# Patient Record
Sex: MALE | Race: WHITE | NOT HISPANIC OR LATINO | Employment: OTHER | URBAN - NONMETROPOLITAN AREA
[De-identification: names, ages, dates, MRNs, and addresses within clinical notes are randomized per-mention and may not be internally consistent; named-entity substitution may affect disease eponyms.]

---

## 2021-05-24 ENCOUNTER — OFFICE VISIT (OUTPATIENT)
Dept: OBGYN CLINIC | Facility: CLINIC | Age: 56
End: 2021-05-24
Payer: COMMERCIAL

## 2021-05-24 VITALS
BODY MASS INDEX: 30.61 KG/M2 | HEIGHT: 72 IN | HEART RATE: 81 BPM | SYSTOLIC BLOOD PRESSURE: 150 MMHG | WEIGHT: 226 LBS | DIASTOLIC BLOOD PRESSURE: 90 MMHG | TEMPERATURE: 97.5 F

## 2021-05-24 DIAGNOSIS — S82.832A OTHER CLOSED FRACTURE OF DISTAL END OF LEFT FIBULA, INITIAL ENCOUNTER: ICD-10-CM

## 2021-05-24 DIAGNOSIS — M25.572 CHRONIC PAIN OF LEFT ANKLE: Primary | ICD-10-CM

## 2021-05-24 DIAGNOSIS — G89.29 CHRONIC PAIN OF LEFT ANKLE: Primary | ICD-10-CM

## 2021-05-24 PROCEDURE — 27786 TREATMENT OF ANKLE FRACTURE: CPT | Performed by: ORTHOPAEDIC SURGERY

## 2021-05-24 PROCEDURE — 99243 OFF/OP CNSLTJ NEW/EST LOW 30: CPT | Performed by: ORTHOPAEDIC SURGERY

## 2021-05-24 RX ORDER — NITROGLYCERIN 0.4 MG/1
0.4 TABLET SUBLINGUAL
COMMUNITY

## 2021-05-24 RX ORDER — CLOPIDOGREL BISULFATE 75 MG/1
75 TABLET ORAL DAILY
COMMUNITY
Start: 2021-05-09

## 2021-05-24 RX ORDER — AZITHROMYCIN 250 MG/1
1 TABLET, FILM COATED ORAL DAILY
COMMUNITY
Start: 2021-04-12

## 2021-05-24 RX ORDER — ALBUTEROL SULFATE 90 UG/1
2 AEROSOL, METERED RESPIRATORY (INHALATION) CONTINUOUS PRN
COMMUNITY
Start: 2021-04-12

## 2021-05-24 RX ORDER — MINOCYCLINE HYDROCHLORIDE 100 MG/1
100 CAPSULE ORAL 2 TIMES DAILY
COMMUNITY
Start: 2021-05-19

## 2021-05-24 RX ORDER — ROSUVASTATIN CALCIUM 20 MG/1
20 TABLET, COATED ORAL DAILY
COMMUNITY
Start: 2021-05-09

## 2021-05-24 RX ORDER — METOPROLOL SUCCINATE 50 MG/1
50 TABLET, EXTENDED RELEASE ORAL DAILY
COMMUNITY
Start: 2020-12-02

## 2021-05-24 RX ORDER — GABAPENTIN 100 MG/1
1 CAPSULE ORAL 3 TIMES DAILY
COMMUNITY
Start: 2021-05-19

## 2021-05-24 NOTE — PROGRESS NOTES
ASSESSMENT/PLAN:    Diagnoses and all orders for this visit:    Chronic pain of left ankle    Other closed fracture of distal end of left fibula, initial encounter          Plan: I reviewed the x-rays with him  A Cam boot was recommended and provided today  This is to be worn whenever he is up and around but may be removed for sleeping at night and for showering or bathing  Precautions have been reviewed, instructions provided and questions answered  I will see him in 3 weeks for re-evaluation with x-rays obtained at that time  He was encouraged to contact me if any questions or concerns arise  Return in about 3 weeks (around 6/14/2021)  _____________________________________________________  CHIEF COMPLAINT:  Chief Complaint   Patient presents with    Left Ankle - Pain, Numbness, Swelling         SUBJECTIVE:  Leo Santiago is a 61y o  year old male who presents for evaluation of his left ankle injured about 3 weeks ago when he suffered an inversion injury while mowing his lawn  He initially thought he had a sprain but due to persistent pain, swelling and difficulty tolerating ambulation he presented to his primary care physician's office on 05/20/2021  X-rays were obtained and he was referred for orthopedic consultation and treatment  He was not provided with any kind of brace or cast boot  He has been using an elastic sleeve  He complains of decreasing swelling but increasing pain  He is a self-employed  and has not been able to work due to the pain in his ankle  He states that he did not seek immediate medical attention as he thought he had sprained the ankle and did not need medical attention  He denies paresthesias  He complains of pain laterally        PAST MEDICAL HISTORY:  Past Medical History:   Diagnosis Date    ASCVD (arteriosclerotic cardiovascular disease)     Bronchitis, complicated     CAD (coronary artery disease) 2013    2 stents    Rosacea        PAST SURGICAL HISTORY:  Past Surgical History:   Procedure Laterality Date    ORIF ANKLE FRACTURE Right 2015       FAMILY HISTORY:  History reviewed  No pertinent family history  SOCIAL HISTORY:  Social History     Tobacco Use    Smoking status: Current Every Day Smoker     Packs/day: 0 25     Types: Cigarettes    Smokeless tobacco: Never Used    Tobacco comment: 36 pk yr hx   Substance Use Topics    Alcohol use: Not Currently     Frequency: Never    Drug use: Yes     Types: Marijuana     Comment: medical card, vape       MEDICATIONS:    Current Outpatient Medications:     albuterol (Ventolin HFA) 90 mcg/act inhaler, Inhale 2 puffs continuous as needed, Disp: , Rfl:     azithromycin (ZITHROMAX) 250 mg tablet, Take 1 tablet by mouth daily, Disp: , Rfl:     clopidogrel (PLAVIX) 75 mg tablet, Take 75 mg by mouth daily, Disp: , Rfl:     gabapentin (NEURONTIN) 100 mg capsule, Take 1 capsule by mouth 3 (three) times a day, Disp: , Rfl:     metoprolol succinate (TOPROL-XL) 50 mg 24 hr tablet, Take 50 mg by mouth daily, Disp: , Rfl:     minocycline (MINOCIN) 100 mg capsule, Take 100 mg by mouth 2 (two) times a day, Disp: , Rfl:     rosuvastatin (CRESTOR) 20 MG tablet, Take 20 mg by mouth daily, Disp: , Rfl:     nitroglycerin (NITROSTAT) 0 4 mg SL tablet, Place 0 4 mg under the tongue every 5 (five) minutes as needed for chest pain, Disp: , Rfl:     ALLERGIES:  No Known Allergies    Review of systems:   Constitutional: Negative for fatigue, fever or loss of apetite  HENT: Negative  Respiratory: Negative for shortness of breath, dyspnea  Cardiovascular: Negative for chest pain/tightness  Gastrointestinal: Negative for abdominal pain, N/V  Endocrine: Negative for cold/heat intolerance, unexplained weight loss/gain  Genitourinary: Negative for flank pain, dysuria, hematuria  Musculoskeletal:  Positive as in the HPI   Skin: Negative for rash      Neurological:  Negative  Psychiatric/Behavioral: Negative for agitation  _____________________________________________________  PHYSICAL EXAMINATION:    Blood pressure 150/90, pulse 81, temperature 97 5 °F (36 4 °C), height 6' (1 829 m), weight 103 kg (226 lb)  General: well developed and well nourished, alert, oriented times 3 and appears comfortable  Psychiatric: Normal  HEENT: Benign  Cardiovascular: Regular    Pulmonary: No wheezing or stridor  Abdomen: Soft, Nontender  Skin: No masses, erythema, lacerations, fluctation, ulcerations  Neurovascular: Motor and sensory exams are grossly intact except as limited by his injury  Pulses are palpable  MUSCULOSKELETAL EXAMINATION:    The left lower extremity exam demonstrates swelling of the lower part of the lower leg and the ankle/foot  He complains of diffuse tenderness about the ankle and foot but had significant tenderness over the distal fibula  He has somewhat limited active range of motion secondary to swelling and complains of pain primarily with plantar flexion  He has no instability with inversion or eversion stress although he does complain of pain  The skin is intact, warm and dry he has good color and capillary refill  The remainder of the lower extremity examination bilaterally is benign  _____________________________________________________  STUDIES REVIEWED:  X-rays of his ankle demonstrated a nondisplaced distal fibular fracture distal to the joint surface which is actually somewhat difficult to visualize on x-rays  The report was reviewed in Care everywhere      PROCEDURES:  Fracture / Dislocation Treatment    Date/Time: 5/24/2021 8:44 AM  Performed by: Tori Vera  Authorized by: Tori Vera     Patient Location:  Clinic  Verbal consent obtained?: Yes    Written consent obtained?: No    Risks and benefits: Risks, benefits and alternatives were discussed    Consent given by:  Patient  Patient states understanding of procedure being performed: Yes    Test results available and properly labeled: Yes    Radiology Images displayed and confirmed   If images not available, report reviewed: Yes    Injury location:  Ankle  Location details:  Left ankle  Injury type:  Fracture  Fracture type: lateral malleolus    Fracture type: lateral malleolus    Neurovascular status: Neurovascularly intact    Local anesthesia used?: No    Manipulation performed?: No    Immobilization:  Brace (Cast boot)  Patient tolerance:  Patient tolerated the procedure well with no immediate complications          Jose A Verdin

## 2021-06-14 ENCOUNTER — HOSPITAL ENCOUNTER (OUTPATIENT)
Dept: RADIOLOGY | Facility: CLINIC | Age: 56
Discharge: HOME | End: 2021-06-14
Payer: COMMERCIAL

## 2021-06-14 ENCOUNTER — OFFICE VISIT (OUTPATIENT)
Dept: OBGYN CLINIC | Facility: CLINIC | Age: 56
End: 2021-06-14

## 2021-06-14 VITALS
HEIGHT: 72 IN | HEART RATE: 70 BPM | WEIGHT: 225 LBS | DIASTOLIC BLOOD PRESSURE: 75 MMHG | SYSTOLIC BLOOD PRESSURE: 120 MMHG | BODY MASS INDEX: 30.48 KG/M2 | TEMPERATURE: 98.2 F

## 2021-06-14 DIAGNOSIS — S82.832D OTHER CLOSED FRACTURE OF DISTAL END OF LEFT FIBULA WITH ROUTINE HEALING, SUBSEQUENT ENCOUNTER: ICD-10-CM

## 2021-06-14 DIAGNOSIS — S82.832D OTHER CLOSED FRACTURE OF DISTAL END OF LEFT FIBULA WITH ROUTINE HEALING, SUBSEQUENT ENCOUNTER: Primary | ICD-10-CM

## 2021-06-14 PROCEDURE — 73610 X-RAY EXAM OF ANKLE: CPT

## 2021-06-14 PROCEDURE — 99024 POSTOP FOLLOW-UP VISIT: CPT | Performed by: ORTHOPAEDIC SURGERY

## 2021-06-14 RX ORDER — TRAMADOL HYDROCHLORIDE 50 MG/1
1 TABLET ORAL EVERY 12 HOURS PRN
COMMUNITY
Start: 2021-06-02

## 2021-06-14 NOTE — PROGRESS NOTES
Patient Name:  Lilia Ventura  MRN:  [id number]    Assessment     1  Other closed fracture of distal end of left fibula with routine healing, subsequent encounter  XR ankle 3+ vw left    Ankle Cude ankle/Ankle Brace       Plan     1  I would recommend follow-up in 3 weeks  He was transition from the walker cast boot to an ankle brace  He is permitted weight-bearing as tolerated  X-rays will be obtained at follow-up if clinically indicated  I have strongly encouraged him to avoid the use of narcotics which were provided by his primary care physician due to the potential side effects  I would think that over-the-counter analgesics should be adequate  He states that he has tried taking over-the-counter analgesics without benefit  However, I still would recommend he avoid narcotic analgesics  Return in about 3 weeks (around 7/5/2021)  Subjective   Lilia Ventura returns for follow-up of   Left ankle  The patient is 3 week(s) post  Closed fracture of the distal end of left fibula and returns for routine follow-up  Patient complains of  constant moderate to severe pain which is increased with weight-bearing activities  He has been compliant with wearing the cam boot with exception of hygiene purposes and sleeping  He states his swelling has decreased since his last appointment however a still experiencing moderate swelling  He does use tramadol  Twice a day for pain relief  He denies any further injury or trauma to his left ankle  Objective     /75   Pulse 70   Temp 98 2 °F (36 8 °C)   Ht 6' (1 829 m)   Wt 102 kg (225 lb)   BMI 30 52 kg/m²     Motor and sensory exams are grossly intact, distal pulses are palpable  Limb is warm and well perfused good color and capillary refill the toes  Moderate ankle swelling  No erythema, ecchymosis, or edema    He complains of diffuse ankle pain including over the distal fibular shaft from approximately the junction of the middle and distal thirds distally to the tip of the lateral malleolus, over the ankle ligaments and the lateral aspect of the foot  He has good range of motion  Data Review     I have personally reviewed pertinent films in PACS which demonstrates a nondisplaced distal fibula fracture with bony trabeculae crossing the fracture site indicating significant progression of healing  This fracture is well distal to the joint line      Negrito Prost

## 2025-04-30 ENCOUNTER — APPOINTMENT (EMERGENCY)
Dept: RADIOLOGY | Facility: HOSPITAL | Age: 60
End: 2025-04-30
Payer: OTHER GOVERNMENT

## 2025-04-30 ENCOUNTER — HOSPITAL ENCOUNTER (EMERGENCY)
Facility: HOSPITAL | Age: 60
Discharge: HOME | End: 2025-04-30
Attending: EMERGENCY MEDICINE
Payer: OTHER GOVERNMENT

## 2025-04-30 VITALS
DIASTOLIC BLOOD PRESSURE: 76 MMHG | TEMPERATURE: 97.3 F | BODY MASS INDEX: 32.5 KG/M2 | RESPIRATION RATE: 19 BRPM | WEIGHT: 239.64 LBS | SYSTOLIC BLOOD PRESSURE: 137 MMHG | OXYGEN SATURATION: 96 % | HEART RATE: 95 BPM

## 2025-04-30 DIAGNOSIS — R07.89 ATYPICAL CHEST PAIN: Primary | ICD-10-CM

## 2025-04-30 LAB
2HR DELTA HS TROPONIN: 5 NG/L
APTT PPP: 27 SECONDS (ref 23–34)
BASOPHILS # BLD AUTO: 0.04 THOUSANDS/ÂΜL (ref 0–0.1)
BASOPHILS NFR BLD AUTO: 1 % (ref 0–1)
BNP SERPL-MCNC: 228 PG/ML (ref 0–100)
CARDIAC TROPONIN I PNL SERPL HS: 40 NG/L (ref ?–50)
CARDIAC TROPONIN I PNL SERPL HS: 45 NG/L (ref ?–50)
D DIMER PPP FEU-MCNC: 0.33 UG/ML FEU
EOSINOPHIL # BLD AUTO: 0.18 THOUSAND/ÂΜL (ref 0–0.61)
EOSINOPHIL NFR BLD AUTO: 2 % (ref 0–6)
ERYTHROCYTE [DISTWIDTH] IN BLOOD BY AUTOMATED COUNT: 12.5 % (ref 11.6–15.1)
HCT VFR BLD AUTO: 50.3 % (ref 36.5–49.3)
HGB BLD-MCNC: 16.6 G/DL (ref 12–17)
IMM GRANULOCYTES # BLD AUTO: 0.02 THOUSAND/UL (ref 0–0.2)
IMM GRANULOCYTES NFR BLD AUTO: 0 % (ref 0–2)
INR PPP: 0.96 (ref 0.85–1.19)
LYMPHOCYTES # BLD AUTO: 2.92 THOUSANDS/ÂΜL (ref 0.6–4.47)
LYMPHOCYTES NFR BLD AUTO: 37 % (ref 14–44)
MCH RBC QN AUTO: 30.2 PG (ref 26.8–34.3)
MCHC RBC AUTO-ENTMCNC: 33 G/DL (ref 31.4–37.4)
MCV RBC AUTO: 92 FL (ref 82–98)
MONOCYTES # BLD AUTO: 0.5 THOUSAND/ÂΜL (ref 0.17–1.22)
MONOCYTES NFR BLD AUTO: 6 % (ref 4–12)
NEUTROPHILS # BLD AUTO: 4.25 THOUSANDS/ÂΜL (ref 1.85–7.62)
NEUTS SEG NFR BLD AUTO: 54 % (ref 43–75)
NRBC BLD AUTO-RTO: 0 /100 WBCS
PLATELET # BLD AUTO: 247 THOUSANDS/UL (ref 149–390)
PMV BLD AUTO: 10.4 FL (ref 8.9–12.7)
PROTHROMBIN TIME: 13.2 SECONDS (ref 12.3–15)
RBC # BLD AUTO: 5.49 MILLION/UL (ref 3.88–5.62)
WBC # BLD AUTO: 7.91 THOUSAND/UL (ref 4.31–10.16)

## 2025-04-30 PROCEDURE — 84484 ASSAY OF TROPONIN QUANT: CPT | Performed by: EMERGENCY MEDICINE

## 2025-04-30 PROCEDURE — 85610 PROTHROMBIN TIME: CPT | Performed by: EMERGENCY MEDICINE

## 2025-04-30 PROCEDURE — 99285 EMERGENCY DEPT VISIT HI MDM: CPT | Performed by: EMERGENCY MEDICINE

## 2025-04-30 PROCEDURE — 85025 COMPLETE CBC W/AUTO DIFF WBC: CPT | Performed by: EMERGENCY MEDICINE

## 2025-04-30 PROCEDURE — 85730 THROMBOPLASTIN TIME PARTIAL: CPT | Performed by: EMERGENCY MEDICINE

## 2025-04-30 PROCEDURE — 96374 THER/PROPH/DIAG INJ IV PUSH: CPT

## 2025-04-30 PROCEDURE — 83880 ASSAY OF NATRIURETIC PEPTIDE: CPT | Performed by: EMERGENCY MEDICINE

## 2025-04-30 PROCEDURE — 36415 COLL VENOUS BLD VENIPUNCTURE: CPT | Performed by: EMERGENCY MEDICINE

## 2025-04-30 PROCEDURE — 71045 X-RAY EXAM CHEST 1 VIEW: CPT

## 2025-04-30 PROCEDURE — 99285 EMERGENCY DEPT VISIT HI MDM: CPT

## 2025-04-30 PROCEDURE — 85379 FIBRIN DEGRADATION QUANT: CPT | Performed by: EMERGENCY MEDICINE

## 2025-04-30 PROCEDURE — 93005 ELECTROCARDIOGRAM TRACING: CPT

## 2025-04-30 RX ORDER — KETOROLAC TROMETHAMINE 30 MG/ML
15 INJECTION, SOLUTION INTRAMUSCULAR; INTRAVENOUS ONCE
Status: COMPLETED | OUTPATIENT
Start: 2025-04-30 | End: 2025-04-30

## 2025-04-30 RX ADMIN — KETOROLAC TROMETHAMINE 15 MG: 30 INJECTION, SOLUTION INTRAMUSCULAR; INTRAVENOUS at 19:43

## 2025-04-30 NOTE — ED PROVIDER NOTES
Time reflects when diagnosis was documented in both MDM as applicable and the Disposition within this note       Time User Action Codes Description Comment    4/30/2025 10:15 PM Angie Driver Add [R07.89] Atypical chest pain           ED Disposition       ED Disposition   Discharge    Condition   Stable    Date/Time   Wed Apr 30, 2025 10:15 PM    Comment   Efrain Elizondo discharge to home/self care.                   Assessment & Plan       Medical Decision Making  Exam without evidence of volume overload so doubt heart failure.  EKG without signs of active ischemia.  Given the timing of pain to ER presentation single/delta troponin negative so doubt NSTEMI.  Presentation not consistent with acute PE, pneumothorax (CXR negative), thoracic aortic dissection, pericarditis, tamponade, pneumonia (no signs of infection, CXR negative), myocarditis.  Patient with pain for the past month, relatively unremarkable workup in the emergency department today, recommended outpatient follow-up with cardiology, return if symptoms worsen.    Problems Addressed:  Atypical chest pain: chronic illness or injury    Amount and/or Complexity of Data Reviewed  Labs: ordered. Decision-making details documented in ED Course.  Radiology: ordered and independent interpretation performed. Decision-making details documented in ED Course.  ECG/medicine tests: ordered and independent interpretation performed. Decision-making details documented in ED Course.    Risk  Prescription drug management.        ED Course as of 04/30/25 2241   Wed Apr 30, 2025 2023 APTT   2023 Protime-INR   2023 HS Troponin 0hr (reflex protocol)   2023 CBC and differential(!)   2023 D-Dimer   2211 CBC and differential(!)   2211 HS Troponin I 2hr   2211 D-Dimer   2211 B-Type Natriuretic Peptide(BNP)(!)   2211 APTT   2211 HS Troponin 0hr (reflex protocol)   2211 Protime-INR   2241 ECG 12 lead       Medications   ketorolac (TORADOL) injection 15 mg (15 mg Intravenous  Given 4/30/25 1943)       ED Risk Strat Scores   HEART Risk Score      Flowsheet Row Most Recent Value   Heart Score Risk Calculator    History 0 Filed at: 04/30/2025 2239   ECG 0 Filed at: 04/30/2025 2239   Age 1 Filed at: 04/30/2025 2239   Risk Factors 2 Filed at: 04/30/2025 2239   Troponin 2 Filed at: 04/30/2025 2239   HEART Score 5 Filed at: 04/30/2025 2239          HEART Risk Score      Flowsheet Row Most Recent Value   Heart Score Risk Calculator    History 0 Filed at: 04/30/2025 2239   ECG 0 Filed at: 04/30/2025 2239   Age 1 Filed at: 04/30/2025 2239   Risk Factors 2 Filed at: 04/30/2025 2239   Troponin 2 Filed at: 04/30/2025 2239   HEART Score 5 Filed at: 04/30/2025 2239                      No data recorded        SBIRT 20yo+      Flowsheet Row Most Recent Value   Initial Alcohol Screen: US AUDIT-C     1. How often do you have a drink containing alcohol? 0 Filed at: 04/30/2025 1922   2. How many drinks containing alcohol do you have on a typical day you are drinking?  0 Filed at: 04/30/2025 1922   3a. Male UNDER 65: How often do you have five or more drinks on one occasion? 0 Filed at: 04/30/2025 1922   3b. FEMALE Any Age, or MALE 65+: How often do you have 4 or more drinks on one occassion? 0 Filed at: 04/30/2025 1917   Audit-C Score 0 Filed at: 04/30/2025 1922   SEYMOUR: How many times in the past year have you...    Used an illegal drug or used a prescription medication for non-medical reasons? Never Filed at: 04/30/2025 1922                            History of Present Illness       Chief Complaint   Patient presents with    Chest Pain     Chest pain X 1 month, stated its get real tight and he becomes SOB.        Past Medical History:   Diagnosis Date    ASCVD (arteriosclerotic cardiovascular disease)     Bronchitis, complicated     CAD (coronary artery disease) 2013    2 stents    Rosacea       Past Surgical History:   Procedure Laterality Date    ORIF ANKLE FRACTURE Right 2015      History reviewed. No  pertinent family history.   Social History     Tobacco Use    Smoking status: Every Day     Current packs/day: 0.25     Types: Cigarettes    Smokeless tobacco: Never    Tobacco comments:     40 pk yr hx   Vaping Use    Vaping status: Never Used   Substance Use Topics    Alcohol use: Not Currently    Drug use: Not Currently     Types: Marijuana     Comment: medical card, vape      E-Cigarette/Vaping    E-Cigarette Use Never User       E-Cigarette/Vaping Substances    Nicotine No     THC No     CBD No     Flavoring No     Other No     Unknown No       I have reviewed and agree with the history as documented.     Patient is a 60-year-old male brought to the emergency department by corrections officers complaining of right sided chest pain that is been going on for the past month, he reports when the pain is more severe he feels short of breath, denies cough or congestion, no fever or chills, no injury, no sick contacts, patient does report previous history of ACS with 2 coronary artery stents        Review of Systems   Constitutional: Negative.    HENT: Negative.     Eyes: Negative.    Respiratory:  Positive for shortness of breath.    Cardiovascular:  Positive for chest pain.   Gastrointestinal: Negative.    Endocrine: Negative.    Genitourinary: Negative.    Musculoskeletal: Negative.    Skin: Negative.    Allergic/Immunologic: Negative.    Neurological: Negative.    Hematological: Negative.    Psychiatric/Behavioral: Negative.             Objective       ED Triage Vitals   Temperature Pulse Blood Pressure Respirations SpO2 Patient Position - Orthostatic VS   04/30/25 1914 04/30/25 1914 04/30/25 1914 04/30/25 1914 04/30/25 1914 04/30/25 1914   (!) 97.3 °F (36.3 °C) 104 140/89 18 95 % Lying      Temp Source Heart Rate Source BP Location FiO2 (%) Pain Score    04/30/25 1914 04/30/25 1914 04/30/25 1914 -- 04/30/25 1943    Temporal Monitor Left arm  10 - Worst Possible Pain      Vitals      Date and Time Temp Pulse SpO2  Resp BP Pain Score FACES Pain Rating User   04/30/25 2115 -- 95 96 % 19 137/76 -- --    04/30/25 2045 -- 98 96 % 20 119/68 -- --    04/30/25 2000 -- 93 98 % 20 121/80 -- --    04/30/25 1945 -- 100 97 % 18 131/66 -- --    04/30/25 1943 -- -- -- -- -- 10 - Worst Possible Pain --    04/30/25 1914 97.3 °F (36.3 °C) 104 95 % 18 140/89 -- -- SV            Physical Exam  Constitutional:       Appearance: He is well-developed.   HENT:      Head: Normocephalic and atraumatic.   Eyes:      Conjunctiva/sclera: Conjunctivae normal.      Pupils: Pupils are equal, round, and reactive to light.   Cardiovascular:      Rate and Rhythm: Normal rate.   Pulmonary:      Effort: Pulmonary effort is normal.   Abdominal:      Palpations: Abdomen is soft.   Musculoskeletal:         General: Normal range of motion.      Cervical back: Normal range of motion and neck supple.   Skin:     General: Skin is warm and dry.   Neurological:      Mental Status: He is alert and oriented to person, place, and time.         Results Reviewed       Procedure Component Value Units Date/Time    HS Troponin I 2hr [[id number]]  (Normal) Collected: 04/30/25 2141    Lab Status: Final result Specimen: Blood from Arm, Right Updated: 04/30/25 2209     hs TnI 2hr 45 ng/L      Delta 2hr hsTnI 5 ng/L     B-Type Natriuretic Peptide(BNP) [[id number]]  (Abnormal) Collected: 04/30/25 1944    Lab Status: Final result Specimen: Blood from Arm, Right Updated: 04/30/25 2032      pg/mL     HS Troponin I 4hr [[id number]]     Lab Status: No result Specimen: Blood     HS Troponin 0hr (reflex protocol) [[id number]]  (Normal) Collected: 04/30/25 1944    Lab Status: Final result Specimen: Blood from Arm, Right Updated: 04/30/25 2016     hs TnI 0hr 40 ng/L     D-Dimer [[id number]]  (Normal) Collected: 04/30/25 1944    Lab Status: Final result Specimen: Blood from Arm, Right Updated: 04/30/25 2007     D-Dimer, Quant 0.33 ug/ml FEU     Narrative:      In the evaluation for  possible pulmonary embolism, in the appropriate (Well's Score of 4 or less) patient, the age adjusted d-dimer cutoff for this patient can be calculated as:    Age x 0.01 (in ug/mL) for Age-adjusted D-dimer exclusion threshold for a patient over 50 years.    Protime-INR [[id number]]  (Normal) Collected: 04/30/25 1944    Lab Status: Final result Specimen: Blood from Arm, Right Updated: 04/30/25 2005     Protime 13.2 seconds      INR 0.96    Narrative:      INR Therapeutic Range    Indication                                             INR Range      Atrial Fibrillation                                               2.0-3.0  Hypercoagulable State                                    2.0.2.3  Left Ventricular Asist Device                            2.0-3.0  Mechanical Heart Valve                                  -    Aortic(with afib, MI, embolism, HF, LA enlargement,    and/or coagulopathy)                                     2.0-3.0 (2.5-3.5)     Mitral                                                             2.5-3.5  Prosthetic/Bioprosthetic Heart Valve               2.0-3.0  Venous thromboembolism (VTE: VT, PE        2.0-3.0    APTT [[id number]]  (Normal) Collected: 04/30/25 1944    Lab Status: Final result Specimen: Blood from Arm, Right Updated: 04/30/25 2005     PTT 27 seconds     CBC and differential [[id number]]  (Abnormal) Collected: 04/30/25 1944    Lab Status: Final result Specimen: Blood from Arm, Right Updated: 04/30/25 1951     WBC 7.91 Thousand/uL      RBC 5.49 Million/uL      Hemoglobin 16.6 g/dL      Hematocrit 50.3 %      MCV 92 fL      MCH 30.2 pg      MCHC 33.0 g/dL      RDW 12.5 %      MPV 10.4 fL      Platelets 247 Thousands/uL      nRBC 0 /100 WBCs      Segmented % 54 %      Immature Grans % 0 %      Lymphocytes % 37 %      Monocytes % 6 %      Eosinophils Relative 2 %      Basophils Relative 1 %      Absolute Neutrophils 4.25 Thousands/µL      Absolute Immature Grans 0.02 Thousand/uL      Absolute  Lymphocytes 2.92 Thousands/µL      Absolute Monocytes 0.50 Thousand/µL      Eosinophils Absolute 0.18 Thousand/µL      Basophils Absolute 0.04 Thousands/µL             XR chest 1 view portable   ED Interpretation by Angie Driver DO (04/30 2241)   No acute findings          ECG 12 Lead Documentation Only    Date/Time: 4/30/2025 8:07 PM    Performed by: Angie Driver DO  Authorized by: Angie Driver DO    Indications / Diagnosis:  Chest pain  ECG reviewed by me, the ED Provider: yes    Patient location:  ED  Previous ECG:     Comparison to cardiac monitor: Yes    Interpretation:     Interpretation: normal    Rate:     ECG rate:  106    ECG rate assessment: tachycardic    Rhythm:     Rhythm: sinus tachycardia    Ectopy:     Ectopy: none    QRS:     QRS axis:  Right    QRS intervals:  Normal  Conduction:     Conduction: abnormal      Abnormal conduction: incomplete RBBB    ST segments:     ST segments:  Normal  T waves:     T waves: inverted      Inverted:  III      ED Medication and Procedure Management   Prior to Admission Medications   Prescriptions Last Dose Informant Patient Reported? Taking?   albuterol (Ventolin HFA) 90 mcg/act inhaler   Yes No   Sig: Inhale 2 puffs continuous as needed   azithromycin (ZITHROMAX) 250 mg tablet   Yes No   Sig: Take 1 tablet by mouth daily   clopidogrel (PLAVIX) 75 mg tablet   Yes No   Sig: Take 75 mg by mouth daily   gabapentin (NEURONTIN) 100 mg capsule   Yes No   Sig: Take 1 capsule by mouth 3 (three) times a day   metoprolol succinate (TOPROL-XL) 50 mg 24 hr tablet   Yes No   Sig: Take 50 mg by mouth daily   minocycline (MINOCIN) 100 mg capsule   Yes No   Sig: Take 100 mg by mouth 2 (two) times a day   nitroglycerin (NITROSTAT) 0.4 mg SL tablet   Yes No   Sig: Place 0.4 mg under the tongue every 5 (five) minutes as needed for chest pain   rosuvastatin (CRESTOR) 20 MG tablet   Yes No   Sig: Take 20 mg by mouth daily   traMADol (ULTRAM) 50 mg tablet   Yes No   Sig: Take 1  tablet by mouth every 12 (twelve) hours as needed      Facility-Administered Medications: None     Discharge Medication List as of 4/30/2025 10:17 PM        CONTINUE these medications which have NOT CHANGED    Details   albuterol (Ventolin HFA) 90 mcg/act inhaler Inhale 2 puffs continuous as needed, Starting Mon 4/12/2021, Historical Med      azithromycin (ZITHROMAX) 250 mg tablet Take 1 tablet by mouth daily, Starting Mon 4/12/2021, Historical Med      clopidogrel (PLAVIX) 75 mg tablet Take 75 mg by mouth daily, Starting Sun 5/9/2021, Historical Med      gabapentin (NEURONTIN) 100 mg capsule Take 1 capsule by mouth 3 (three) times a day, Starting Wed 5/19/2021, Historical Med      metoprolol succinate (TOPROL-XL) 50 mg 24 hr tablet Take 50 mg by mouth daily, Starting Wed 12/2/2020, Historical Med      minocycline (MINOCIN) 100 mg capsule Take 100 mg by mouth 2 (two) times a day, Starting Wed 5/19/2021, Historical Med      nitroglycerin (NITROSTAT) 0.4 mg SL tablet Place 0.4 mg under the tongue every 5 (five) minutes as needed for chest pain, Historical Med      rosuvastatin (CRESTOR) 20 MG tablet Take 20 mg by mouth daily, Starting Sun 5/9/2021, Historical Med      traMADol (ULTRAM) 50 mg tablet Take 1 tablet by mouth every 12 (twelve) hours as needed, Starting Wed 6/2/2021, Historical Med             ED SEPSIS DOCUMENTATION   Time reflects when diagnosis was documented in both MDM as applicable and the Disposition within this note       Time User Action Codes Description Comment    4/30/2025 10:15 PM Angie Driver Add [R07.89] Atypical chest pain                  Angie Driver,   04/30/25 0738

## 2025-05-01 LAB
ATRIAL RATE: 106 BPM
P AXIS: 51 DEGREES
PR INTERVAL: 148 MS
QRS AXIS: 102 DEGREES
QRSD INTERVAL: 92 MS
QT INTERVAL: 360 MS
QTC INTERVAL: 478 MS
T WAVE AXIS: 95 DEGREES
VENTRICULAR RATE: 106 BPM